# Patient Record
Sex: FEMALE | Race: WHITE | NOT HISPANIC OR LATINO | Employment: FULL TIME | URBAN - METROPOLITAN AREA
[De-identification: names, ages, dates, MRNs, and addresses within clinical notes are randomized per-mention and may not be internally consistent; named-entity substitution may affect disease eponyms.]

---

## 2021-08-23 ENCOUNTER — OFFICE VISIT (OUTPATIENT)
Dept: INTERNAL MEDICINE CLINIC | Facility: CLINIC | Age: 46
End: 2021-08-23

## 2021-08-23 VITALS
BODY MASS INDEX: 21.86 KG/M2 | TEMPERATURE: 98.1 F | OXYGEN SATURATION: 98 % | HEART RATE: 68 BPM | DIASTOLIC BLOOD PRESSURE: 82 MMHG | HEIGHT: 66 IN | SYSTOLIC BLOOD PRESSURE: 118 MMHG | WEIGHT: 136 LBS

## 2021-08-23 DIAGNOSIS — Z00.00 ROUTINE GENERAL MEDICAL EXAMINATION AT HEALTH CARE FACILITY: Primary | ICD-10-CM

## 2021-08-23 DIAGNOSIS — Z11.1 SCREENING EXAMINATION FOR PULMONARY TUBERCULOSIS: ICD-10-CM

## 2021-08-23 PROCEDURE — 99499 UNLISTED E&M SERVICE: CPT | Performed by: INTERNAL MEDICINE

## 2021-08-23 RX ORDER — AMLODIPINE BESYLATE 5 MG/1
5 TABLET ORAL DAILY
COMMUNITY
Start: 2021-03-29

## 2021-08-23 RX ORDER — METHOCARBAMOL 750 MG/1
TABLET, FILM COATED ORAL
COMMUNITY
Start: 2021-05-05

## 2021-08-23 RX ORDER — IBUPROFEN 800 MG/1
800 TABLET ORAL EVERY 8 HOURS PRN
COMMUNITY
Start: 2021-05-04 | End: 2022-05-04

## 2021-08-23 RX ORDER — LOVASTATIN 20 MG/1
20 TABLET ORAL
COMMUNITY
Start: 2021-07-19

## 2021-08-23 RX ORDER — HYDROCHLOROTHIAZIDE 25 MG/1
25 TABLET ORAL DAILY
COMMUNITY
Start: 2021-08-13

## 2021-08-23 NOTE — PROGRESS NOTES
Assessment/Plan:             1  Routine general medical examination at health care facility    2  Screening examination for pulmonary tuberculosis           Subjective:      Patient ID: Florencia Ritchie is a 48 y o  female  Physical for person direct support      The following portions of the patient's history were reviewed and updated as appropriate: She  has a past medical history of Essential hypertension, benign and Mixed hyperlipidemia  She   Patient Active Problem List    Diagnosis Date Noted    Routine general medical examination at health care facility 08/23/2021    Screening examination for pulmonary tuberculosis 08/23/2021     She  has a past surgical history that includes No past surgeries  Her family history includes Hyperlipidemia in her father  She  reports that she has never smoked  She has never used smokeless tobacco  She reports previous alcohol use  No history on file for drug use  Current Outpatient Medications   Medication Sig Dispense Refill    amLODIPine (NORVASC) 5 mg tablet Take 5 mg by mouth daily      hydrochlorothiazide (HYDRODIURIL) 25 mg tablet Take 25 mg by mouth daily      ibuprofen (MOTRIN) 800 mg tablet Take 800 mg by mouth every 8 (eight) hours as needed      lovastatin (MEVACOR) 20 mg tablet Take 20 mg by mouth      methocarbamol (ROBAXIN) 750 mg tablet TAKE ONE TABLET BY MOUTH FOUR TIMES DAILY AS NEEDED for muscle spasms (Patient not taking: Reported on 8/23/2021)       No current facility-administered medications for this visit       Current Outpatient Medications on File Prior to Visit   Medication Sig    amLODIPine (NORVASC) 5 mg tablet Take 5 mg by mouth daily    hydrochlorothiazide (HYDRODIURIL) 25 mg tablet Take 25 mg by mouth daily    ibuprofen (MOTRIN) 800 mg tablet Take 800 mg by mouth every 8 (eight) hours as needed    lovastatin (MEVACOR) 20 mg tablet Take 20 mg by mouth    methocarbamol (ROBAXIN) 750 mg tablet TAKE ONE TABLET BY MOUTH FOUR TIMES DAILY AS NEEDED for muscle spasms (Patient not taking: Reported on 8/23/2021)     No current facility-administered medications on file prior to visit  She is allergic to grass pollen([id number] [id number])       Review of Systems   Constitutional: Negative for chills and fever  HENT: Negative for congestion, ear pain and sore throat  Eyes: Negative for pain  Respiratory: Negative for cough and shortness of breath  Cardiovascular: Negative for chest pain and leg swelling  Gastrointestinal: Negative for abdominal pain, nausea and vomiting  Endocrine: Negative for polyuria  Genitourinary: Negative for difficulty urinating, frequency and urgency  Musculoskeletal: Negative for arthralgias and back pain  Skin: Negative for rash  Neurological: Negative for weakness and headaches  Psychiatric/Behavioral: Negative for sleep disturbance  The patient is not nervous/anxious  Objective:      /82 (BP Location: Right arm, Patient Position: Sitting, Cuff Size: Standard)   Pulse 68   Temp 98 1 °F (36 7 °C) (Temporal)   Ht 5' 5 5" (1 664 m)   Wt 61 7 kg (136 lb)   SpO2 98%   BMI 22 29 kg/m²     No results found for this or any previous visit (from the past 1344 hour(s))  Physical Exam  Constitutional:       Appearance: Normal appearance  HENT:      Head: Normocephalic  Right Ear: Tympanic membrane, ear canal and external ear normal       Left Ear: Tympanic membrane, ear canal and external ear normal       Nose: Nose normal  No congestion  Mouth/Throat:      Mouth: Mucous membranes are moist       Pharynx: Oropharynx is clear  No oropharyngeal exudate or posterior oropharyngeal erythema  Eyes:      Extraocular Movements: Extraocular movements intact  Conjunctiva/sclera: Conjunctivae normal       Pupils: Pupils are equal, round, and reactive to light  Cardiovascular:      Rate and Rhythm: Normal rate and regular rhythm  Heart sounds: Normal heart sounds   No murmur heard      Pulmonary:      Effort: Pulmonary effort is normal       Breath sounds: Normal breath sounds  No wheezing or rales  Abdominal:      General: Bowel sounds are normal  There is no distension  Palpations: Abdomen is soft  Tenderness: There is no abdominal tenderness  Musculoskeletal:         General: Normal range of motion  Cervical back: Normal range of motion and neck supple  Right lower leg: No edema  Left lower leg: No edema  Lymphadenopathy:      Cervical: No cervical adenopathy  Skin:     General: Skin is warm  Neurological:      General: No focal deficit present  Mental Status: She is alert and oriented to person, place, and time

## 2021-08-24 PROCEDURE — 86580 TB INTRADERMAL TEST: CPT
